# Patient Record
Sex: FEMALE | Race: BLACK OR AFRICAN AMERICAN | NOT HISPANIC OR LATINO | Employment: OTHER | URBAN - METROPOLITAN AREA
[De-identification: names, ages, dates, MRNs, and addresses within clinical notes are randomized per-mention and may not be internally consistent; named-entity substitution may affect disease eponyms.]

---

## 2017-02-22 ENCOUNTER — HOSPITAL ENCOUNTER (EMERGENCY)
Facility: HOSPITAL | Age: 52
Discharge: HOME | End: 2017-02-22
Attending: EMERGENCY MEDICINE
Payer: MEDICARE

## 2017-02-22 VITALS
WEIGHT: 230 LBS | HEIGHT: 64 IN | TEMPERATURE: 99 F | OXYGEN SATURATION: 100 % | HEART RATE: 50 BPM | DIASTOLIC BLOOD PRESSURE: 88 MMHG | RESPIRATION RATE: 12 BRPM | BODY MASS INDEX: 39.27 KG/M2 | SYSTOLIC BLOOD PRESSURE: 122 MMHG

## 2017-02-22 DIAGNOSIS — K27.9 PUD (PEPTIC ULCER DISEASE): ICD-10-CM

## 2017-02-22 DIAGNOSIS — N39.0 URINARY TRACT INFECTION WITHOUT HEMATURIA, SITE UNSPECIFIED: Primary | ICD-10-CM

## 2017-02-22 DIAGNOSIS — N89.8 VAGINAL DISCHARGE: ICD-10-CM

## 2017-02-22 LAB
BACTERIA #/AREA URNS HPF: NORMAL /HPF
BILIRUB UR QL STRIP: NEGATIVE
CLARITY UR: CLEAR
COLOR UR: YELLOW
GLUCOSE UR QL STRIP: NEGATIVE
GRAM STN SPEC: NORMAL
GRAM STN SPEC: NORMAL
HGB UR QL STRIP: NEGATIVE
KETONES UR QL STRIP: NEGATIVE
LEUKOCYTE ESTERASE UR QL STRIP: ABNORMAL
MICROSCOPIC COMMENT: NORMAL
NITRITE UR QL STRIP: NEGATIVE
PH UR STRIP: 6 [PH] (ref 5–8)
PROT UR QL STRIP: NEGATIVE
RBC #/AREA URNS HPF: 2 /HPF (ref 0–4)
SP GR UR STRIP: 1.02 (ref 1–1.03)
SQUAMOUS #/AREA URNS HPF: 2 /HPF
URN SPEC COLLECT METH UR: ABNORMAL
UROBILINOGEN UR STRIP-ACNC: NEGATIVE EU/DL
WBC #/AREA URNS HPF: 1 /HPF (ref 0–5)

## 2017-02-22 PROCEDURE — 87086 URINE CULTURE/COLONY COUNT: CPT

## 2017-02-22 PROCEDURE — 81000 URINALYSIS NONAUTO W/SCOPE: CPT

## 2017-02-22 PROCEDURE — 87205 SMEAR GRAM STAIN: CPT

## 2017-02-22 PROCEDURE — 99284 EMERGENCY DEPT VISIT MOD MDM: CPT

## 2017-02-22 RX ORDER — SUCRALFATE 1 G/1
1 TABLET ORAL 4 TIMES DAILY
Qty: 120 TABLET | Refills: 0 | Status: SHIPPED | OUTPATIENT
Start: 2017-02-22 | End: 2017-03-24

## 2017-02-22 RX ORDER — FLUCONAZOLE 200 MG/1
200 TABLET ORAL DAILY
Qty: 7 TABLET | Refills: 0 | Status: SHIPPED | OUTPATIENT
Start: 2017-02-22 | End: 2017-03-01

## 2017-02-22 RX ORDER — CEPHALEXIN 250 MG/1
250 CAPSULE ORAL 4 TIMES DAILY
Qty: 28 CAPSULE | Refills: 0 | Status: SHIPPED | OUTPATIENT
Start: 2017-02-22 | End: 2017-03-01

## 2017-02-22 NOTE — DISCHARGE INSTRUCTIONS

## 2017-02-22 NOTE — ED TRIAGE NOTES
"Pt presents to ED via POV with c/o left lumbar back pain that raidiates around to her lower abdomen. She states that she has frequent kidney infections and knows this is what is is and also reports a white creamy vaginal discharge, "its a yeast infection. I get them a lot too". Pt reports she does not want an IV, does not want IV fluids "I can buy Pedialyte" and does not want a pelvic. She request abx and diflucan, stating " erythromicin works best for me". Pt also reports N/V/D that began yesterday, diarrhea relieved with OTC immodium. Pt denies any other complaints. Mother at the bedside.   "

## 2017-02-22 NOTE — ED PROVIDER NOTES
Encounter Date: 2/22/2017       History     Chief Complaint   Patient presents with    Abdominal Pain     Pt reports abdominal pain, with flank pain on left side x 2 days. Also reports vomiting and diarrhea.      Review of patient's allergies indicates:  No Known Allergies  Patient is a 51 y.o. female presenting with the following complaint: dysuria and female genitourinary complaint. The history is provided by the patient and a parent.   Dysuria    This is a recurrent problem. The current episode started yesterday. The problem occurs every urination. The quality of the pain is described as aching. Associated symptoms include nausea, discharge, urgency and flank pain. Pertinent negatives include no chills, no sweats, no vomiting and no hematuria. Her past medical history is significant for recurrent UTIs. Her past medical history does not include kidney stones, single kidney, urological procedure, urinary stasis or catheterization.   Female  Problem   Primary symptoms include discharge, genital odor, dysuria.    Primary symptoms include no fever.   This is a new problem. The problem has been unchanged. The discharge was white. Associated symptoms include abdominal pain (chronic), diarrhea and nausea. Pertinent negatives include no diaphoresis and no vomiting. Associated medical issues do not include STD or vaginosis.    patient reports 2 day history of dysuria and malodorous urine.  Sheer report some mild left-sided flank pain.  She reports chronic epigastric discomfort in pain from gastric ulcers.  She states she takes Nexium and Protonix.  She admittedly has a very poor diet and states that she eats mostly potato chips and cookies.  Mother confirms is very poor diet and states that she refuses to eat any vegetables due to them making her gassy.    Past Medical History   Diagnosis Date    Complication of anesthesia     Hyperlipidemia     Reflux     Thyroid disease      nodule    Ulcer      Past Medical  History Pertinent Negatives   Diagnosis Date Noted    Abnormal Pap smear 1/29/2013     Past Surgical History   Procedure Laterality Date    Hysterectomy  2/4/2013     Robotic      Family History   Problem Relation Age of Onset    Ovarian cancer Maternal Grandmother     Diabetes Mother     Heart disease Mother     Hyperlipidemia Mother     Hypertension Mother     Diabetes Father     Heart disease Father     Hyperlipidemia Father     Hypertension Father     Breast cancer Neg Hx     Colon cancer Neg Hx     Cirrhosis Neg Hx      Social History   Substance Use Topics    Smoking status: Never Smoker    Smokeless tobacco: Never Used    Alcohol use No     Review of Systems   Constitutional: Negative for chills, diaphoresis and fever.   HENT: Negative for sore throat.    Respiratory: Negative for shortness of breath.    Cardiovascular: Negative for chest pain.   Gastrointestinal: Positive for abdominal pain (chronic), diarrhea and nausea. Negative for vomiting.   Genitourinary: Positive for dysuria, flank pain, urgency and vaginal discharge. Negative for hematuria.   Musculoskeletal: Negative for back pain.   Skin: Negative for rash.   Neurological: Negative for weakness.   Hematological: Does not bruise/bleed easily.       Physical Exam   Initial Vitals   BP Pulse Resp Temp SpO2   02/22/17 1135 02/22/17 1135 02/22/17 1135 02/22/17 1135 02/22/17 1135   121/75 49 18 98.1 °F (36.7 °C) 100 %     Physical Exam    Vitals reviewed.  Constitutional: She appears well-developed and well-nourished.   HENT:   Head: Normocephalic and atraumatic.   Nose: Nose normal.   Mouth/Throat: No oropharyngeal exudate.   Eyes: EOM are normal. Pupils are equal, round, and reactive to light.   Neck: Normal range of motion. Neck supple. No JVD present.   Cardiovascular: Regular rhythm and normal heart sounds. Bradycardia present.  Exam reveals no gallop and no friction rub.    No murmur heard.  Pulmonary/Chest: Breath sounds normal.  No stridor. No respiratory distress. She has no wheezes. She has no rhonchi. She has no rales. She exhibits no tenderness.   Abdominal: Soft. Bowel sounds are normal. She exhibits no distension and no mass. There is no tenderness. There is no rigidity, no rebound, no guarding, no CVA tenderness, no tenderness at McBurney's point and negative Payan's sign.   Genitourinary:   Genitourinary Comments: Pelvic examination was deferred and refused by the patient   Musculoskeletal: Normal range of motion. She exhibits no edema or tenderness.   Neurological: She is alert and oriented to person, place, and time. She has normal strength. No sensory deficit.   Skin: Skin is warm and dry.   Psychiatric: She has a normal mood and affect. Thought content normal.         ED Course   Procedures  Labs Reviewed   GRAM STAIN   CULTURE, URINE   URINALYSIS        Medical decision-making:    The patient received a medical screening exam. If performed, the EKG was independently evaluated by me and is pending final cardiology evaluation.  If performed, all radiographic studies were independently evaluated by me and are pending final radiology evaluation. If labs were ordered, they were reviewed. Vital signs are independently assessed by me.  If performed, the pulse oximetry was independently evaluated by me.  I decided to obtain the patient's past medical record.  If available, I reviewed the patient's past medical record, including most recent labs and radiology reports.    This is an emergent department evaluation for patient complaining of dysuria.  Differential diagnosis includes UTI, pyelonephritis, urinary retention, urolithiasis, or urosepsis.    Clinically the patient's symptoms are consistent with a urinary tract infection.       The patient is afebrile and does not have an elevated white blood cell count.  There is no lateralization to the patient's pain.  I do not think this is pyelonephritis.   I do not think this is  urosepsis.I do not think the patient has urolithiasis.    The patient will be treated with antibiotics as an outpatient and has been given specific return precautions.   The patient deferred pelvic examination.  I indicated to her that this may limit evaluation and diagnosis of severe life-threatening infections including PID, TOA and STDs.  Patient understood.  She will follow-up with primary care.    Patient will be treated with Keflex for her urinary tract infection.  A UA is pending.  Patient's abdominal pain is long-standing and chronic in nature.  She states that she had a scope initial gastric ulcers.  She eats very poorly.  This is not helping her gastritis.  We'll start her on Carafate and refer her to primary care for further evaluation and GI referral.  I do not think she has any perforated hollow viscus.  Her abdomen is soft and nontender.    The results and physical exam findings were reviewed with the patient. Pt agrees with assessment, disposition and treatment plan and has no further questions or complaints at this time.    KAYCEE Martinez M.D. 3:00 PM 2/22/2017                           ED Course     Clinical Impression:   The primary encounter diagnosis was Urinary tract infection without hematuria, site unspecified. Diagnoses of Vaginal discharge and PUD (peptic ulcer disease) were also pertinent to this visit.          Jacky Martinez MD  02/22/17 6260

## 2017-02-22 NOTE — ED AVS SNAPSHOT
[facility]  [place] [address]               Gerri Gay   2017 12:44 PM   ED    Description:  Female : 1965   Department:  [facility]           Your Care was Coordinated By:     Provider Role From To    Jacky Martinez MD Attending Provider 17 1250 --      Reason for Visit     Abdominal Pain           Diagnoses this Visit        Comments    Urinary tract infection without hematuria, site unspecified    -  Primary     Vaginal discharge         PUD (peptic ulcer disease)           ED Disposition     ED Disposition Condition Comment    Discharge             To Do List           Follow-up Information     Schedule an appointment as soon as possible for a visit with [facility] [place][note 2].    Contact information:    [address] [facility] [id number] [phone]          Follow up with [facility].    Specialty:  Emergency Medicine    Why:  As needed, If symptoms worsen    Contact information:    [place] [id number] [phone]       These Medications        Disp Refills Start End    cephALEXin (KEFLEX) 250 MG capsule 28 capsule 0 2017 3/1/2017    Take 1 capsule (250 mg total) by mouth 4 (four) times daily. - Oral    fluconazole (DIFLUCAN) 200 MG Tab 7 tablet 0 2017 3/1/2017    Take 1 tablet (200 mg total) by mouth once daily. - Oral    sucralfate (CARAFATE) 1 gram tablet 120 tablet 0 2017 3/24/2017    Take 1 tablet (1 g total) by mouth 4 (four) times daily. - Oral      [facility] On [facility]     [facility] -  Assistance  Registered nurses in the [facility] provide clinical advisement, health education, appointment booking, and other advisory services.  Call for this free service at [phone].             Medications           Message regarding Medications     Verify the changes and/or  "additions to your medication regime listed below are the same as discussed with your clinician today.  If any of these changes or additions are incorrect, please notify your healthcare provider.        START taking these NEW medications        Refills    cephALEXin (KEFLEX) 250 MG capsule 0    Sig: Take 1 capsule (250 mg total) by mouth 4 (four) times daily.    Class: Print    Route: Oral    fluconazole (DIFLUCAN) 200 MG Tab 0    Sig: Take 1 tablet (200 mg total) by mouth once daily.    Class: Print    Route: Oral    sucralfate (CARAFATE) 1 gram tablet 0    Sig: Take 1 tablet (1 g total) by mouth 4 (four) times daily.    Class: Print    Route: Oral      STOP taking these medications     cimetidine (TAGAMET) 200 MG tablet Take 200 mg by mouth 2 (two) times daily.           Verify that the below list of medications is an accurate representation of the medications you are currently taking.  If none reported, the list may be blank. If incorrect, please contact your healthcare provider. Carry this list with you in case of emergency.           Current Medications     ferrous gluconate (FERGON) 324 MG tablet Take 324 mg by mouth daily with breakfast.    fluticasone (FLONASE) 50 mcg/actuation nasal spray 1 spray by Nasal route once daily.    vitamin D 185 MG Tab Take 185 mg by mouth once daily.    cephALEXin (KEFLEX) 250 MG capsule Take 1 capsule (250 mg total) by mouth 4 (four) times daily.    fluconazole (DIFLUCAN) 200 MG Tab Take 1 tablet (200 mg total) by mouth once daily.    sucralfate (CARAFATE) 1 gram tablet Take 1 tablet (1 g total) by mouth 4 (four) times daily.           Clinical Reference Information           Your Vitals Were     BP Pulse Temp Resp Height Weight    124/90 50 98.3 °F (36.8 °C) (Oral) 16 5' 4" (1.626 m) 104.3 kg (230 lb)    SpO2 BMI             100% 39.48 kg/m2         Allergies as of 2/22/2017     No Known Allergies      Immunizations Administered on Date of Encounter - 2/22/2017     None      ED " "Micro, Lab, POCT     Start Ordered       Status Ordering Provider    02/22/17 1257 02/22/17 1257  Urinalysis  Once      Final result     02/22/17 1257 02/22/17 1257  Gram stain  STAT      Final result     02/22/17 1257 02/22/17 1257  Urine culture  STAT      In process     02/22/17 1257 02/22/17 1257  Urinalysis Microscopic  Once      Final result       ED Imaging Orders     None        Discharge Instructions         Bladder Infection, Female (Adult)    Urine is normally doesn't have any bacteria in it. But bacteria can get into the urinary tract from the skin around the rectum. Or they can travel in the blood from elsewhere in the body. Once they are in your urinary tract, they can cause infection in the urethra (urethritis), the bladder (cystitis), or the kidneys (pyelonephritis).  The most common place for an infection is in the bladder. This is called a bladder infection. This is one of the most common infections in women. Most bladder infections are easily treated. They are not serious unless the infection spreads to the kidney.  The phrases "bladder infection," "UTI," and "cystitis" are often used to describe the same thing. But they are not always the same. Cystitis is an inflammation of the bladder. The most common cause of cystitis is an infection.  Symptoms  The infection causes inflammation in the urethra and bladder. This causes many of the symptoms. The most common symptoms of a bladder infection are:  · Pain or burning when urinating  · Having to urinate more often than usual  · Urgent need to urinate  · Only a small amount of urine comes out  · Blood in urine  · Abdominal discomfort. This is usually in the lower abdomen above the pubic bone.  · Cloudy urine  · Strong- or bad-smelling urine  · Unable to urinate (urinary retention)  · Unable to hold urine in (urinary incontinence)  · Fever  · Loss of appetite  · Confusion (in older adults)  Causes  Bladder infections are not contagious. You can't get " one from someone else, from a toilet seat, or from sharing a bath.  The most common cause of bladder infections is bacteria from the bowels. The bacteria get onto the skin around the opening of the urethra. From there, they can get into the urine and travel up to the bladder, causing inflammation and infection. This usually happens because of:  · Wiping improperly after urinating. Always wipe from front to back.  · Bowel incontinence  · Pregnancy  · Procedures such as having a catheter inserted  · Older age  · Not emptying your bladder. This can allow bacteria a chance to grow in your urine.  · Dehydration  · Constipation  · Sex  · Use of a diaphragm for birth control   Treatment  Bladder infections are diagnosed by a urine test. They are treated with antibiotics and usually clear up quickly without complications. Treatment helps prevent a more serious kidney infection.  Medicines  Medicines can help in the treatment of a bladder infection:  · Take antibiotics until they are used up, even if you feel better. It is important to finish them to make sure the infection has cleared.  · You can use acetaminophen or ibuprofen for pain, fever, or discomfort, unless another medicine was prescribed. If you have chronic liver or kidney disease, talk with your healthcare provider before using these medicines. Also talk with your provider if you've ever had a stomach ulcer or gastrointestinal bleeding, or are taking blood-thinner medicines.  · If you are given phenazopydridine to reduce burning with urination, it will cause your urine to become a bright orange color. This can stain clothing.  Care and prevention  These self-care steps can help prevent future infections:  · Drink plenty of fluids to prevent dehydration and flush out your bladder. Do this unless you must restrict fluids for other health reasons, or your doctor told you not to.  · Proper cleaning after going to the bathroom is important. Wipe from front to back  after using the toilet to prevent the spread of bacteria.  · Urinate more often. Don't try to hold urine in for a long time.  · Wear loose-fitting clothes and cotton underwear. Avoid tight-fitting pants.  · Improve your diet and prevent constipation. Eat more fresh fruit and vegetables, and fiber, and less junk and fatty foods.  · Avoid sex until your symptoms are gone.  · Avoid caffeine, alcohol, and spicy foods. These can irritate your bladder.  · Urinate right after intercourse to flush out your bladder.  · If you use birth control pills and have frequent bladder infections, discuss it with your doctor.  Follow-up care  Call your healthcare provider if all symptoms are not gone after 3 days of treatment. This is especially important if you have repeat infections.  If a culture was done, you will be told if your treatment needs to be changed. If directed, you can call to find out the results.  If X-rays were done, you will be told if the results will affect your treatment.  Call 911  Call 911 if any of the following occur:  · Trouble breathing  · Hard to wake up or confusion  · Fainting or loss of consciousness  · Rapid heart rate  When to seek medical advice  Call your healthcare provider right away if any of these occur:  · Fever of 100.4ºF (38.0ºC) or higher, or as directed by your healthcare provider  · Symptoms are not better by the third day of treatment  · Back or belly (abdominal) pain that gets worse  · Repeated vomiting, or unable to keep medicine down  · Weakness or dizziness  · Vaginal discharge  · Pain, redness, or swelling in the outer vaginal area (labia)  Date Last Reviewed: 10/1/2016  © 5195-2903 [employer]. [address] [place] [address]. All rights reserved. This information is not intended as a substitute for professional medical care. Always follow your healthcare professional's instructions.          [facility] Sign-Up     Activating your [facility] account is as easy  as 1-2-3!     1) Visit [email or web address], select Sign Up Now, enter this activation code and your date of birth, then select Next.  [email or web address]  Expires: 4/8/2017  2:45 PM      2) Create a username and password to use when you visit [facility] in the future and select a security question in case you lose your password and select Next.    3) Enter your e-mail address and click Sign Up!    Additional Information  If you have questions, please e-mail [email or web address] or call [phone] to talk to our [facility] staff. Remember, [facility] is NOT to be used for urgent needs. For medical emergencies, dial 911.          [facility] complies with applicable Federal civil rights laws and does not discriminate on the basis of race, color, national origin, age, disability, or sex.        Language Assistance Services     ATTENTION: Language assistance services are available, free of charge. Please call [phone].      ATENCIÓN: Si habla español, tiene a dumont disposición servicios gratuitos de asistencia lingüística. Llame al [phone].     CHÚ Ý: N?u b?n nói Ti?ng Vi?t, có các d?ch v? h? tr? ngôn ng? mi?n phí dành cho b?n. G?i s? [phone].

## 2017-02-22 NOTE — ED NOTES
Report given to Carmencita FINK. Pt appears to be resting comfortably and in no acute distress at this time. Respirations are even and unlabored. Bed rails are raised, wheels locked, bed in lowest position with call light within reach.

## 2017-02-24 LAB — BACTERIA UR CULT: NORMAL

## 2017-10-12 NOTE — ED NOTES
Report received from DANAE Caballero. Pt resting comfortably in no acute distress. Updated pt and family on plan of care. Bed low and call light in reach. AAO X3   stated

## 2018-08-29 ENCOUNTER — HOSPITAL ENCOUNTER (EMERGENCY)
Facility: HOSPITAL | Age: 53
Discharge: HOME | End: 2018-08-29
Attending: EMERGENCY MEDICINE
Payer: MEDICARE

## 2018-08-29 VITALS
WEIGHT: 230 LBS | HEART RATE: 60 BPM | OXYGEN SATURATION: 98 % | HEIGHT: 64 IN | RESPIRATION RATE: 18 BRPM | DIASTOLIC BLOOD PRESSURE: 71 MMHG | BODY MASS INDEX: 39.27 KG/M2 | TEMPERATURE: 99 F | SYSTOLIC BLOOD PRESSURE: 141 MMHG

## 2018-08-29 DIAGNOSIS — K21.9 GASTROESOPHAGEAL REFLUX DISEASE WITHOUT ESOPHAGITIS: ICD-10-CM

## 2018-08-29 DIAGNOSIS — R07.9 CHEST PAIN OF UNCERTAIN ETIOLOGY: Primary | ICD-10-CM

## 2018-08-29 LAB
ALBUMIN SERPL BCP-MCNC: 3.8 G/DL
ALP SERPL-CCNC: 124 U/L
ALT SERPL W/O P-5'-P-CCNC: 14 U/L
ANION GAP SERPL CALC-SCNC: 10 MMOL/L
AST SERPL-CCNC: 15 U/L
BASOPHILS # BLD AUTO: 0.02 K/UL
BASOPHILS NFR BLD: 0.3 %
BILIRUB SERPL-MCNC: 1.3 MG/DL
BNP SERPL-MCNC: 12 PG/ML
BUN SERPL-MCNC: 8 MG/DL
CALCIUM SERPL-MCNC: 9.7 MG/DL
CHLORIDE SERPL-SCNC: 108 MMOL/L
CO2 SERPL-SCNC: 25 MMOL/L
CREAT SERPL-MCNC: 0.9 MG/DL
D DIMER PPP IA.FEU-MCNC: 0.31 MG/L FEU
DIFFERENTIAL METHOD: NORMAL
EOSINOPHIL # BLD AUTO: 0.1 K/UL
EOSINOPHIL NFR BLD: 1.7 %
ERYTHROCYTE [DISTWIDTH] IN BLOOD BY AUTOMATED COUNT: 12.9 %
EST. GFR  (AFRICAN AMERICAN): >60 ML/MIN/1.73 M^2
EST. GFR  (NON AFRICAN AMERICAN): >60 ML/MIN/1.73 M^2
GLUCOSE SERPL-MCNC: 98 MG/DL
HCT VFR BLD AUTO: 38.2 %
HGB BLD-MCNC: 12.8 G/DL
LYMPHOCYTES # BLD AUTO: 1.4 K/UL
LYMPHOCYTES NFR BLD: 22.7 %
MCH RBC QN AUTO: 29.4 PG
MCHC RBC AUTO-ENTMCNC: 33.5 G/DL
MCV RBC AUTO: 88 FL
MONOCYTES # BLD AUTO: 0.3 K/UL
MONOCYTES NFR BLD: 4.5 %
NEUTROPHILS # BLD AUTO: 4.3 K/UL
NEUTROPHILS NFR BLD: 70.8 %
PLATELET # BLD AUTO: 246 K/UL
PMV BLD AUTO: 10 FL
POTASSIUM SERPL-SCNC: 4.2 MMOL/L
PROT SERPL-MCNC: 7.1 G/DL
RBC # BLD AUTO: 4.35 M/UL
SODIUM SERPL-SCNC: 143 MMOL/L
TROPONIN I SERPL DL<=0.01 NG/ML-MCNC: <0.006 NG/ML
WBC # BLD AUTO: 6.03 K/UL

## 2018-08-29 PROCEDURE — 83880 ASSAY OF NATRIURETIC PEPTIDE: CPT

## 2018-08-29 PROCEDURE — 84484 ASSAY OF TROPONIN QUANT: CPT

## 2018-08-29 PROCEDURE — S0028 INJECTION, FAMOTIDINE, 20 MG: HCPCS | Performed by: EMERGENCY MEDICINE

## 2018-08-29 PROCEDURE — 99284 EMERGENCY DEPT VISIT MOD MDM: CPT | Mod: 25

## 2018-08-29 PROCEDURE — 25000003 PHARM REV CODE 250: Performed by: EMERGENCY MEDICINE

## 2018-08-29 PROCEDURE — 96374 THER/PROPH/DIAG INJ IV PUSH: CPT

## 2018-08-29 PROCEDURE — 85025 COMPLETE CBC W/AUTO DIFF WBC: CPT

## 2018-08-29 PROCEDURE — 85379 FIBRIN DEGRADATION QUANT: CPT

## 2018-08-29 PROCEDURE — 80053 COMPREHEN METABOLIC PANEL: CPT

## 2018-08-29 RX ORDER — ESOMEPRAZOLE MAGNESIUM 40 MG/1
40 CAPSULE, DELAYED RELEASE ORAL
Qty: 30 CAPSULE | Refills: 0 | Status: SHIPPED | OUTPATIENT
Start: 2018-08-29

## 2018-08-29 RX ORDER — ESOMEPRAZOLE MAGNESIUM 40 MG/1
40 CAPSULE, DELAYED RELEASE ORAL
COMMUNITY
End: 2018-08-29 | Stop reason: SDUPTHER

## 2018-08-29 RX ORDER — FAMOTIDINE 10 MG/ML
20 INJECTION INTRAVENOUS
Status: COMPLETED | OUTPATIENT
Start: 2018-08-29 | End: 2018-08-29

## 2018-08-29 RX ADMIN — FAMOTIDINE 20 MG: 10 INJECTION, SOLUTION INTRAVENOUS at 09:08

## 2018-08-29 NOTE — ED TRIAGE NOTES
"Pt arrived via personal transport with cp that radiates to the left arm; pt states these symptoms started about a week ago but got worse yesterday; pt also c/o a "not" on the ride side; pt denies fevers, chills, and vomiting; pt c/o some sob, palpitations, and nausea; pt states it is a "stabbing" pain  "

## 2018-08-29 NOTE — ED PROVIDER NOTES
"Encounter Date: 8/29/2018    SCRIBE #1 NOTE: I, Gayla Lino, am scribing for, and in the presence of,  Becky Mckee MD. I have scribed the following portions of the note - Other sections scribed: HPI, ROS, PE.       History     Chief Complaint   Patient presents with    Chest Pain     left side chest pain and "I have a knot my right side" x 1 wk; "like a pressure and I'm sob;" reports cough since Sunday     CC: Chest Pain     HPI: 52 y/o female with PMHx of HLD and thyroid disease presents to ED for emergent evaluation of stabbing chest pain that began 1 week ago and worsened yesterday.  Pt reports pain worsens with movement and lying down.  She reports associated SOB, "light dizziness," and nausea (since resolved).  Pt also reports noticing a small knot in her R breast yesterday.  Pt also notes that the veins in her bilateral legs "have been turning black."  Pt notes family hx of heart problems and breast CA.  Pt denies fever, chills, vomiting, diarrhea, abd pain, back pain and neck pain. No tx at home. No other symptoms reported.      The history is provided by the patient. No  was used.     Review of patient's allergies indicates:  No Known Allergies  Past Medical History:   Diagnosis Date    Asthma     Complication of anesthesia     Hyperlipidemia     Reflux     Thyroid disease     nodule    Ulcer      Past Surgical History:   Procedure Laterality Date    HYSTERECTOMY  2/4/2013    Robotic      Family History   Problem Relation Age of Onset    Ovarian cancer Maternal Grandmother     Diabetes Mother     Heart disease Mother     Hyperlipidemia Mother     Hypertension Mother     Diabetes Father     Heart disease Father     Hyperlipidemia Father     Hypertension Father     Breast cancer Neg Hx     Colon cancer Neg Hx     Cirrhosis Neg Hx      Social History     Tobacco Use    Smoking status: Never Smoker    Smokeless tobacco: Never Used   Substance Use Topics    Alcohol " use: No    Drug use: No     Review of Systems   Constitutional: Negative for chills and fever.   HENT: Negative for ear pain and sore throat.    Eyes: Negative for pain.   Respiratory: Positive for shortness of breath (resolved). Negative for cough.    Cardiovascular: Positive for chest pain. Negative for palpitations.   Gastrointestinal: Positive for nausea (resolved). Negative for abdominal pain, diarrhea and vomiting.   Genitourinary: Negative for dysuria and urgency.   Musculoskeletal: Negative for back pain and neck pain.        (+) R breast nodule   Skin: Negative for wound.   Neurological: Positive for dizziness (resolved). Negative for syncope and headaches.   All other systems reviewed and are negative.      Physical Exam     Initial Vitals [08/29/18 0852]   BP Pulse Resp Temp SpO2   (!) 141/71 60 18 98.8 °F (37.1 °C) 98 %      MAP       --         Physical Exam    Nursing note and vitals reviewed.  Constitutional: She appears well-developed and well-nourished. She is not diaphoretic. No distress.   HENT:   Head: Normocephalic and atraumatic.   Mouth/Throat: Oropharynx is clear and moist.   Eyes: EOM are normal. Pupils are equal, round, and reactive to light. No scleral icterus.   Neck: Normal range of motion. Neck supple. No JVD present.   Cardiovascular: Normal rate, regular rhythm and intact distal pulses.   Pulmonary/Chest: Breath sounds normal. No stridor. No respiratory distress.   Abdominal: Soft. Bowel sounds are normal. She exhibits no distension. There is no tenderness (mild epigastric).   Musculoskeletal: Normal range of motion. She exhibits no edema or tenderness.   1 cm soft, mobile, non-adherent nodule to medial aspect of R breast   Neurological: She is alert and oriented to person, place, and time. She has normal strength. No cranial nerve deficit or sensory deficit.   Skin: Skin is warm and dry.   Psychiatric: She has a normal mood and affect.         ED Course   Procedures  Labs Reviewed    COMPREHENSIVE METABOLIC PANEL - Abnormal; Notable for the following components:       Result Value    Total Bilirubin 1.3 (*)     All other components within normal limits   CBC W/ AUTO DIFFERENTIAL   TROPONIN I   B-TYPE NATRIURETIC PEPTIDE   D DIMER, QUANTITATIVE     EKG Readings: (Independently Interpreted)   Rhythm: Sinus Bradycardia. Heart Rate: 56. Ectopy: No Ectopy. Conduction: Normal. ST Segments: Normal ST Segments. T Waves: Normal.       Imaging Results          X-Ray Chest PA And Lateral (Final result)  Result time 08/29/18 09:25:53    Final result by Nitish Hendricks MD (08/29/18 09:25:53)                 Impression:      No acute findings.      Electronically signed by: Nitish Hendricks MD  Date:    08/29/2018  Time:    09:25             Narrative:    EXAMINATION:  XR CHEST PA AND LATERAL    CLINICAL HISTORY:  Chest Pain;    TECHNIQUE:  PA and lateral views of the chest were performed.    COMPARISON:  None    FINDINGS:  The cardiomediastinal contour is within normal limits.  The lungs are clear.  No pneumothorax.  No pleural effusions.                              X-Rays:   Independently Interpreted Readings:   Chest X-Ray: No infiltrates.  No acute abnormalities.     Medical Decision Making:   History:   Old Medical Records: I decided to obtain old medical records.  Old Records Summarized: records from previous admission(s).  Differential Diagnosis:   GERD  Gastritis  Hepatobiliary disease  ACS  PE  Independently Interpreted Test(s):   I have ordered and independently interpreted X-rays - see prior notes.  I have ordered and independently interpreted EKG Reading(s) - see prior notes  Clinical Tests:   Lab Tests: Ordered and Reviewed  Radiological Study: Ordered and Reviewed  Medical Tests: Ordered and Reviewed  ED Management:  Patient afebrile in no acute distress at time history and physical.  EKG has no definite acute ischemic changes.  Patient is noted to be intermittently bradycardic but does not  complain of any symptoms during episodes of bradycardia.  Patient reports that she has been to ask that she at times has this low heart rate.  Patient given IV Pepcid with some improvement in her symptoms. Labs within acceptable limits with negative troponin, negative D-dimer, negative BNP.  I have low clinical suspicion of ACS or PE or CHF in this patient.  She is hemodynamically stable and fit for discharge to follow up with her primary physician. Counseled on supportive care and appropriate medication usage. Dicussed extensively concerning symptoms for which to return to ER and the importance of follow up . Patient expressed understanding and agreement with treatment plan.   This chart completed using dictation software, as a result there may be some typographical errors.             Scribe Attestation:   Scribe #1: I performed the above scribed service and the documentation accurately describes the services I performed. I attest to the accuracy of the note.    Attending Attestation:           Physician Attestation for Scribe:  Physician Attestation Statement for Scribe #1: I, Becky Mckee MD, reviewed documentation, as scribed by Gayla Lino in my presence, and it is both accurate and complete.                    Clinical Impression:   The primary encounter diagnosis was Chest pain of uncertain etiology. A diagnosis of Gastroesophageal reflux disease without esophagitis was also pertinent to this visit.      Disposition:   Disposition: Discharged  Condition: Stable                        Becky Mckee MD  08/31/18 1739

## 2018-08-29 NOTE — DISCHARGE INSTRUCTIONS
Lab testing the emergency room does not suggest you are having an acute heart attack the exact cause of her symptoms is unclear.  They may be due to acid reflux..  It is very important that she take Nexium daily whether or not you are having symptoms. Avoid caffeine, alcohol and spicy foods. Start small amounts of a bland diet and advance as tolerated. Return to ER for fever, chest pain, bloody vomit, or vomit that looks like coffee grounds, or black or bloody stools.  It is very important that you make an appointment to establish care with primary doctor to monitor your symptoms.